# Patient Record
Sex: MALE | Race: WHITE | NOT HISPANIC OR LATINO | URBAN - NONMETROPOLITAN AREA
[De-identification: names, ages, dates, MRNs, and addresses within clinical notes are randomized per-mention and may not be internally consistent; named-entity substitution may affect disease eponyms.]

---

## 2020-01-24 ENCOUNTER — IMPORTED ENCOUNTER (OUTPATIENT)
Dept: URBAN - NONMETROPOLITAN AREA CLINIC 1 | Facility: CLINIC | Age: 50
End: 2020-01-24

## 2020-01-24 PROCEDURE — 92014 COMPRE OPH EXAM EST PT 1/>: CPT

## 2020-01-24 PROCEDURE — 92015 DETERMINE REFRACTIVE STATE: CPT

## 2020-01-24 NOTE — PATIENT DISCUSSION
"*Myopia:.-	  Discussed refractive status with patient in detail. *Presbyopia:.-	  Discussed presbyopia with patient in detail. *Gls RX:.-	  A new spectacle prescription was created and dispensed today. ""*Bifocals:.-	  This patient needs bifocals to improve near and distance vision. -	  Benefits and drawbacks of the various types of lenses were discussed including bifocal trifocal and progressive lenses. "

## 2020-09-22 ENCOUNTER — IMPORTED ENCOUNTER (OUTPATIENT)
Dept: URBAN - NONMETROPOLITAN AREA CLINIC 1 | Facility: CLINIC | Age: 50
End: 2020-09-22

## 2020-09-22 PROCEDURE — S0621 ROUTINE OPHTHALMOLOGICAL EXA: HCPCS

## 2020-11-20 NOTE — PATIENT DISCUSSION
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 Discussed benefits, alternatives, and risks of surgery including (but not limited to) cataract (if natural lens is still present), infection, bleeding, retinal detachment, glaucoma, double vision, optic neuropathy, loss of vision, blindness, and loss of eye. Patient was told the vision may not return to the same level as prior to development of the VMT but should improve as the anatomy returns to a more normal contour. No prediction of the level of visual improvement and/or the degree of distortion reduction can be given.

## 2020-12-16 NOTE — PATIENT DISCUSSION
Recommended OBSERVATION and MONITORING for change. Mauc Instructions: By selecting yes to the question below the MAUC number will be added into the note.  This will be calculated automatically based on the diagnosis chosen, the size entered, the body zone selected (H,M,L) and the specific indications you chose. You will also have the option to override the Mohs AUC if you disagree with the automatically calculated number and this option is found in the Case Summary tab.

## 2021-01-01 NOTE — PATIENT DISCUSSION
Post-op instructions given. Discussed drops, positioning, no strenuous activity and eye protection. Call immediately if eye pain or loss of vision. labor/delivery

## 2021-07-30 NOTE — PATIENT DISCUSSION
7/30/21: NEW FLOATER IN POSTERIOR SEGMENT. NO EVIDENCE OF TEAR/HOLE. ? REMNANT VITREOUS CYST? AFTER PPV. RECOMMEND EVAL BY DR MCRAE TO DETERMINE IF LASER COULD BE AMENABLE TO TREAT IT, OTHERWISE CONSIDER PPV.

## 2021-08-27 NOTE — PATIENT DISCUSSION
8/27/21: GIVEN EPISODE OF PERSISTENT HEADACHE WITH THROBBING EPISODES + TINNITUS + DECLINE IN VISION WILL EVAL MRI/MRA TO R/O VERTEBROBASILAR PROBLEMS. ENT HAS RULED OUT  OTHER ETIOLOGIES.  WILL REFER TO NEUROLOGY FOR ASSISTANCE JAMESON ZURITA

## 2021-08-27 NOTE — PATIENT DISCUSSION
8/27/21: PROGRESSIVE DECLINE IN VISION OF UNKNOWN ETIOLOGY WITH EPISODE OF TVL. NO CLINICAL SX OF GCA. PROPOSED TO GIVE STEROIDS AS A TRIAL, BUT PT REFUSED BECAUSE SHE HAD BEEN TX W STEROIDS RECENTLY (TWICE) AND DIDN'T IMPROVE SX. WILL SEND ESR & CRP. REFER TO NEUROLOGY FOR FURTHER EVAL.

## 2021-10-29 NOTE — PATIENT DISCUSSION
10/29/21: RECOMMEND OBTAINING A NEW RX W DR Geoffrey Greene FOR IMPROVEMENT IN OS. OD WITH PERSISTENT DRYNESS. RECOMMEND LUBRICATION.

## 2022-03-01 NOTE — PATIENT DISCUSSION
3/1/22: PERSISTENT. [facility] RECOMMENDATION OF OBTAINING A NEW RX W DR Jordi Mcgee. MONOCULAR DIPLOPIA CAN BE SECONDARY TO DRYNESS.

## 2022-03-01 NOTE — PATIENT DISCUSSION
10/29/21: RECOMMEND OBTAINING A NEW RX W DR Parr Shaper FOR IMPROVEMENT IN OS. OD WITH PERSISTENT DRYNESS. RECOMMEND LUBRICATION.

## 2022-04-09 ASSESSMENT — VISUAL ACUITY
OS_SC: 20/40
OD_SC: 20/40
OD_PH: 20/30
OD_SC: 20/40
OD_CC: J1
OS_SC: 20/30
OS_CC: J1
OS_PH: 20/30

## 2022-04-09 ASSESSMENT — TONOMETRY
OS_IOP_MMHG: 15
OD_IOP_MMHG: 15
OD_IOP_MMHG: 15
OS_IOP_MMHG: 15

## 2022-04-28 NOTE — PATIENT DISCUSSION
4/28/22: PERSISTENT. NEW GLASSES W CONSISTENT BLURRING WHILE LOOKING AT SIDES [address]. RECOMMEND CHECKING AT A DIFFERENT OPTICAL SHOP TO ASSESS SIZE OF BIFOCAL. OCT SHOWS GOOD REORGANIZATION OF TISSUE AND RECONSTRUCTION OF OUTER RETINA WHICH MAY IMPLY BETTER VA.

## 2022-04-28 NOTE — PATIENT DISCUSSION
10/29/21: RECOMMEND OBTAINING A NEW RX W DR Rees Blocker FOR IMPROVEMENT IN OS. OD WITH PERSISTENT DRYNESS. RECOMMEND LUBRICATION.

## 2022-04-28 NOTE — PATIENT DISCUSSION
3/1/22: PERSISTENT. [facility] RECOMMENDATION OF OBTAINING A NEW RX W  [facility]. MONOCULAR DIPLOPIA CAN BE SECONDARY TO DRYNESS.

## 2022-05-06 NOTE — PATIENT DISCUSSION
3/1/22: PERSISTENT. [facility] RECOMMENDATION OF OBTAINING A NEW RX W DR Bishop Blanchard. MONOCULAR DIPLOPIA CAN BE SECONDARY TO DRYNESS.

## 2022-05-06 NOTE — PATIENT DISCUSSION
4/28/22: PERSISTENT. NEW GLASSES W CONSISTENT BLURRING WHILE LOOKING AT SIDES [address] [place]. RECOMMEND CHECKING AT A DIFFERENT OPTICAL SHOP TO ASSESS SIZE OF BIFOCAL. OCT SHOWS GOOD REORGANIZATION OF TISSUE AND RECONSTRUCTION OF OUTER RETINA WHICH MAY IMPLY BETTER VA.

## 2022-05-06 NOTE — PATIENT DISCUSSION
10/29/21: RECOMMEND OBTAINING A NEW RX W DR George Demarco FOR IMPROVEMENT IN OS. OD WITH PERSISTENT DRYNESS. RECOMMEND LUBRICATION.

## 2022-08-25 NOTE — PATIENT DISCUSSION
3/1/22: PERSISTENT. [facility] RECOMMENDATION OF OBTAINING A NEW RX W DR Kathy Hoff. MONOCULAR DIPLOPIA CAN BE SECONDARY TO DRYNESS.

## 2022-08-25 NOTE — PATIENT DISCUSSION
4/28/22: PERSISTENT. NEW GLASSES W CONSISTENT BLURRING WHILE LOOKING AT SIDES [address] [employer]. RECOMMEND CHECKING AT A DIFFERENT OPTICAL SHOP TO ASSESS SIZE OF BIFOCAL. OCT SHOWS GOOD REORGANIZATION OF TISSUE AND RECONSTRUCTION OF OUTER RETINA WHICH MAY IMPLY BETTER VA.

## 2022-08-25 NOTE — PATIENT DISCUSSION
8/25/22: FA SHOWED SLOW BLOOD FLOW OS. WE'LL FURTHER ASSESS WITH CAROTID ULTRASOUND AND CBC, ESR, CRP TO R/O OTHER CAUSES OF DECREASING VISION NOT.

## 2022-10-24 NOTE — PATIENT DISCUSSION
3/1/22: PERSISTENT. [facility] RECOMMENDATION OF OBTAINING A NEW RX W  [facility]. MONOCULAR DIPLOPIA CAN BE SECONDARY TO DRYNESS.

## 2022-10-24 NOTE — PATIENT DISCUSSION
10/29/21: RECOMMEND OBTAINING A NEW RX W DR Magdalene Fritz FOR IMPROVEMENT IN OS. OD WITH PERSISTENT DRYNESS. RECOMMEND LUBRICATION.

## 2022-10-24 NOTE — PATIENT DISCUSSION
4/28/22: PERSISTENT. NEW GLASSES W CONSISTENT BLURRING WHILE LOOKING AT SIDES [address] [employer]. RECOMMEND CHECKING AT A DIFFERENT OPTICAL SHOP TO ASSESS SIZE OF BIFOCAL. OCT SHOWS GOOD REORGANIZATION OF TISSUE AND RECONSTRUCTION OF OUTER RETINA WHICH MAY IMPLY BETTER VA.

## 2022-10-24 NOTE — PATIENT DISCUSSION
10/24/22: CAROTID WITH <50% BLOCKAGE BILATERALLY. CONT ASA 81 MG DAILY. LOW WBC (3.1) AND HIGH LAMBDA CHAIN. TO BE FOLLOWED.

## 2022-10-24 NOTE — PATIENT DISCUSSION
8/25/22: FA SHOWED SLOW BLOOD FLOW OS. WE'LL FURTHER ASSESS WITH CAROTID ULTRASOUND AND CBC, ESR, CRP TO R/O OTHER CAUSES OF DECREASING VISION NOT. [Follow-Up] : a follow-up visit

## 2022-11-30 NOTE — PATIENT DISCUSSION
Post-op instructions given. Discussed drops, positioning, no strenuous activity and eye protection. Call immediately if eye pain or loss of vision. This is a chronic and recurrent condition.  The patient takes Valtrex as needed.  Currently she is asymptomatic.

## 2022-12-22 NOTE — PATIENT DISCUSSION
ANTICOAGULATION     Savanna Rehman is overdue for INR check.      Called [facility] nurse, [phone], and left message requesting a call back to discuss next INR date or if patient has been inpatient/discharged.    Iris Kemp RN     Patient to start EYE DROPS in the operative eye; Pred- Forte/Prednisolone Acetate 1% & Antibiotic eye drops 4 TIMES A DAY FOR ONE WEEK, then reduce them to 2 TIMES A DAY FOR ONE WEEK, then stop the drops. Wait at least one minute between drops.

## 2023-06-26 NOTE — PATIENT DISCUSSION
This was a single brief postoperative A. fib episode following bypass surgery 1/2019 terminated with a short course of amiodarone with no recurrence  No anticoagulation indicated at this time  Remains in sinus rhythm on today's exam and on EKG earlier this week.  Conduction abnormalities are stable   periodic follow-up surveillance  Patient was asked notify us if heart rates are elevated from current baseline   Well positioned.